# Patient Record
Sex: FEMALE | Race: WHITE | Employment: STUDENT | URBAN - METROPOLITAN AREA
[De-identification: names, ages, dates, MRNs, and addresses within clinical notes are randomized per-mention and may not be internally consistent; named-entity substitution may affect disease eponyms.]

---

## 2018-01-11 ENCOUNTER — OFFICE VISIT (OUTPATIENT)
Dept: OBGYN CLINIC | Facility: CLINIC | Age: 23
End: 2018-01-11

## 2018-01-11 VITALS
SYSTOLIC BLOOD PRESSURE: 110 MMHG | DIASTOLIC BLOOD PRESSURE: 50 MMHG | WEIGHT: 146 LBS | HEIGHT: 65 IN | BODY MASS INDEX: 24.32 KG/M2 | HEART RATE: 96 BPM

## 2018-01-11 DIAGNOSIS — Z11.3 SCREEN FOR STD (SEXUALLY TRANSMITTED DISEASE): ICD-10-CM

## 2018-01-11 DIAGNOSIS — Z30.40 ENCOUNTER FOR SURVEILLANCE OF CONTRACEPTIVES, UNSPECIFIED CONTRACEPTIVE: ICD-10-CM

## 2018-01-11 DIAGNOSIS — Z01.419 WELL WOMAN EXAM WITH ROUTINE GYNECOLOGICAL EXAM: Primary | ICD-10-CM

## 2018-01-11 DIAGNOSIS — T83.32XA INTRAUTERINE CONTRACEPTIVE DEVICE THREADS LOST, INITIAL ENCOUNTER: ICD-10-CM

## 2018-01-11 PROCEDURE — 87591 N.GONORRHOEAE DNA AMP PROB: CPT | Performed by: OBSTETRICS & GYNECOLOGY

## 2018-01-11 PROCEDURE — 88175 CYTOPATH C/V AUTO FLUID REDO: CPT | Performed by: OBSTETRICS & GYNECOLOGY

## 2018-01-11 PROCEDURE — 87491 CHLMYD TRACH DNA AMP PROBE: CPT | Performed by: OBSTETRICS & GYNECOLOGY

## 2018-01-11 PROCEDURE — 99395 PREV VISIT EST AGE 18-39: CPT | Performed by: OBSTETRICS & GYNECOLOGY

## 2018-01-11 NOTE — PROGRESS NOTES
Azeb Jacob is a 25year old female  No LMP recorded. Patient is not currently having periods (Reason: IUD - Intrauterine Device). Patient presents with:  Wellness Visit  IUD: check  . Granger Marielle has been done. Flu shot was encouraged. IUD, 1 each by Intrauterine route once., Disp: , Rfl:     ALLERGIES:    Pcn [Penicillins]       Rash  Pollen                    Seasonal                      Review of Systems:  Constitutional:  Denies fatigue, night sweats, hot flashes  Gastrointestinal: for STD (sexually transmitted disease)    Intrauterine contraceptive device threads lost, initial encounter  -     US PELVIS (TRANSABDOMINAL PELVIS)  ([id number]);  Future

## 2018-01-12 LAB
C TRACH DNA SPEC QL NAA+PROBE: NEGATIVE
N GONORRHOEA DNA SPEC QL NAA+PROBE: NEGATIVE

## 2018-06-26 ENCOUNTER — OFFICE VISIT (OUTPATIENT)
Dept: OBGYN CLINIC | Facility: CLINIC | Age: 23
End: 2018-06-26

## 2018-06-26 VITALS
HEART RATE: 88 BPM | DIASTOLIC BLOOD PRESSURE: 58 MMHG | BODY MASS INDEX: 24.53 KG/M2 | SYSTOLIC BLOOD PRESSURE: 104 MMHG | HEIGHT: 65.25 IN | WEIGHT: 149 LBS

## 2018-06-26 DIAGNOSIS — L70.0 ACNE VULGARIS: Primary | ICD-10-CM

## 2018-06-26 PROCEDURE — 99212 OFFICE O/P EST SF 10 MIN: CPT | Performed by: OBSTETRICS & GYNECOLOGY

## 2018-06-26 RX ORDER — SPIRONOLACTONE 50 MG/1
25 TABLET, FILM COATED ORAL DAILY
Qty: 30 TABLET | Refills: 11 | Status: SHIPPED | OUTPATIENT
Start: 2018-06-26 | End: 2018-07-16

## 2018-06-26 NOTE — PROGRESS NOTES
When she was on the birth control pill her acne was controlled. She has periods on the Mirena at school but not at home. She is noticed increased acne. Does  want us to take Spironolactone 25 we will we will prescribe this.

## 2019-01-05 ENCOUNTER — HOSPITAL ENCOUNTER (OUTPATIENT)
Age: 24
Discharge: HOME | End: 2019-01-05
Attending: FAMILY MEDICINE
Payer: COMMERCIAL

## 2019-01-05 VITALS
TEMPERATURE: 97 F | HEIGHT: 65 IN | SYSTOLIC BLOOD PRESSURE: 103 MMHG | DIASTOLIC BLOOD PRESSURE: 79 MMHG | OXYGEN SATURATION: 100 % | WEIGHT: 145 LBS | BODY MASS INDEX: 24.16 KG/M2 | RESPIRATION RATE: 18 BRPM | HEART RATE: 65 BPM

## 2019-01-05 DIAGNOSIS — L42 PITYRIASIS ROSEA: Primary | ICD-10-CM

## 2019-01-05 LAB — POCT RAPID STREP: NEGATIVE

## 2019-01-05 PROCEDURE — 99214 OFFICE O/P EST MOD 30 MIN: CPT

## 2019-01-05 PROCEDURE — 87081 CULTURE SCREEN ONLY: CPT | Performed by: FAMILY MEDICINE

## 2019-01-05 PROCEDURE — 99203 OFFICE O/P NEW LOW 30 MIN: CPT

## 2019-01-05 PROCEDURE — 87430 STREP A AG IA: CPT | Performed by: FAMILY MEDICINE

## 2019-01-05 NOTE — ED INITIAL ASSESSMENT (HPI)
Pt c/o noticed patch of dry skin to right chest 2 weeks ago, after going on vacation in [facility] and being in hot tubs, noticed more patches of the dry skin/or rash on chest and sides and left upper arm.  Pt reports only the patch on the right chest itche

## 2019-01-06 NOTE — ED PROVIDER NOTES
Patient Seen in: [address] [facility]    History   Patient presents with:  Rash Skin Problem (integumentary)    Stated Complaint: rash and sore throat x 14 days    HPI    21year old female presents for rash and sore throat.  [facility] Normal range of motion. Neck supple. Cardiovascular: Normal rate, regular rhythm and normal heart sounds. Pulmonary/Chest: Effort normal and breath sounds normal.   Abdominal: Soft. She exhibits no distension. There is no tenderness.    Neurological: Sh

## 2019-02-07 ENCOUNTER — OFFICE VISIT (OUTPATIENT)
Dept: INTERNAL MEDICINE CLINIC | Facility: CLINIC | Age: 24
End: 2019-02-07
Payer: COMMERCIAL

## 2019-02-07 VITALS
BODY MASS INDEX: 23.3 KG/M2 | HEART RATE: 64 BPM | SYSTOLIC BLOOD PRESSURE: 90 MMHG | RESPIRATION RATE: 12 BRPM | TEMPERATURE: 98 F | WEIGHT: 146.69 LBS | DIASTOLIC BLOOD PRESSURE: 60 MMHG | HEIGHT: 66.5 IN

## 2019-02-07 DIAGNOSIS — L42 PITYRIASIS ROSEA: Primary | ICD-10-CM

## 2019-02-07 PROCEDURE — 99203 OFFICE O/P NEW LOW 30 MIN: CPT | Performed by: INTERNAL MEDICINE

## 2019-02-07 NOTE — PROGRESS NOTES
[facility]    CHIEF COMPLAINT:  Patient presents with:  Rash: has had rash since December, rash has been continuing to spread. HISTORY OF PRESENT ILLNESS:  The patient is a 21year old year old female who presents with rash for 2 months. of education: Not on file      Highest education level: Not on file    Social Needs      Financial resource strain: Not on file      Food insecurity - worry: Not on file      Food insecurity - inability: Not on file      Transportation needs - medical: Not perform all ADLs):  Yes    BODY HABITUS AND NUTRITION STATUS:  Body mass index is 23.32 kg/m². PHYSICAL EXAM:   BP 90/60 (BP Location: Left arm, Patient Position: Sitting, Cuff Size: adult)   Pulse 64   Temp 98.2 °F (36.8 °C) (Oral)   Resp 12   Ht 66. 5\

## 2019-02-11 ENCOUNTER — OFFICE VISIT (OUTPATIENT)
Dept: INTERNAL MEDICINE CLINIC | Facility: CLINIC | Age: 24
End: 2019-02-11
Payer: COMMERCIAL

## 2019-02-11 ENCOUNTER — TELEPHONE (OUTPATIENT)
Dept: INTERNAL MEDICINE CLINIC | Facility: CLINIC | Age: 24
End: 2019-02-11

## 2019-02-11 VITALS
SYSTOLIC BLOOD PRESSURE: 98 MMHG | WEIGHT: 147.88 LBS | RESPIRATION RATE: 12 BRPM | HEIGHT: 66.5 IN | BODY MASS INDEX: 23.49 KG/M2 | DIASTOLIC BLOOD PRESSURE: 70 MMHG | HEART RATE: 72 BPM | TEMPERATURE: 98 F

## 2019-02-11 DIAGNOSIS — Z00.00 PHYSICAL EXAM, ANNUAL: Primary | ICD-10-CM

## 2019-02-11 DIAGNOSIS — Z23 NEED FOR VACCINATION: ICD-10-CM

## 2019-02-11 PROCEDURE — 90472 IMMUNIZATION ADMIN EACH ADD: CPT | Performed by: INTERNAL MEDICINE

## 2019-02-11 PROCEDURE — 90471 IMMUNIZATION ADMIN: CPT | Performed by: INTERNAL MEDICINE

## 2019-02-11 PROCEDURE — 99395 PREV VISIT EST AGE 18-39: CPT | Performed by: INTERNAL MEDICINE

## 2019-02-11 PROCEDURE — 90715 TDAP VACCINE 7 YRS/> IM: CPT | Performed by: INTERNAL MEDICINE

## 2019-02-11 PROCEDURE — 90686 IIV4 VACC NO PRSV 0.5 ML IM: CPT | Performed by: INTERNAL MEDICINE

## 2019-02-11 NOTE — PROGRESS NOTES
[facility]    CHIEF COMPLAINT: Patient presents with:  Routine Physical: sees gyne, Dr. Danis Sauceda.  5/27/08- Tdap        HPI:   Floyd Bryant is a 21year old female who presents for a complete physical exam. Symptoms: denies discharge, itchin Hypertension Father    • Other (MI) Father 39   • Hypertension Paternal Grandfather    • Heart Disorder Paternal Grandfather    • Other (celiac) Brother    • Other (celiac) Paternal Aunt    • Other (celiac) Paternal Cousin       Social History:   Social Hi intact,motor and sensory are grossly intact    Labs:   No results found for: WBC, HGB, PLT   Lab Results   Component Value Date/Time    GLU 99 09/17/2018 09:07 AM     09/17/2018 09:07 AM    K 4.9 09/17/2018 09:07 AM     09/17/2018 09:07 AM    C

## 2019-02-14 ENCOUNTER — MED REC SCAN ONLY (OUTPATIENT)
Dept: INTERNAL MEDICINE CLINIC | Facility: CLINIC | Age: 24
End: 2019-02-14

## 2019-03-25 PROBLEM — R10.13 EPIGASTRIC PAIN: Status: ACTIVE | Noted: 2019-03-25

## 2019-03-25 PROBLEM — R10.84 ABDOMINAL PAIN, GENERALIZED: Status: ACTIVE | Noted: 2019-03-25

## 2019-03-25 PROBLEM — K92.1 MELENA: Status: ACTIVE | Noted: 2019-03-25

## 2019-03-25 PROBLEM — K64.0 FIRST DEGREE HEMORRHOIDS: Status: ACTIVE | Noted: 2019-03-25

## 2019-03-25 PROBLEM — R19.7 DIARRHEA: Status: ACTIVE | Noted: 2019-03-25

## 2019-07-26 ENCOUNTER — OFFICE VISIT (OUTPATIENT)
Dept: OBGYN CLINIC | Facility: CLINIC | Age: 24
End: 2019-07-26
Payer: COMMERCIAL

## 2019-07-26 VITALS
BODY MASS INDEX: 23.49 KG/M2 | WEIGHT: 141 LBS | DIASTOLIC BLOOD PRESSURE: 58 MMHG | HEIGHT: 65 IN | SYSTOLIC BLOOD PRESSURE: 114 MMHG | HEART RATE: 82 BPM

## 2019-07-26 DIAGNOSIS — Z11.3 SCREEN FOR STD (SEXUALLY TRANSMITTED DISEASE): ICD-10-CM

## 2019-07-26 DIAGNOSIS — Z01.419 WELL WOMAN EXAM WITH ROUTINE GYNECOLOGICAL EXAM: Primary | ICD-10-CM

## 2019-07-26 DIAGNOSIS — Z30.40 ENCOUNTER FOR SURVEILLANCE OF CONTRACEPTIVES, UNSPECIFIED CONTRACEPTIVE: ICD-10-CM

## 2019-07-26 PROCEDURE — 99395 PREV VISIT EST AGE 18-39: CPT | Performed by: OBSTETRICS & GYNECOLOGY

## 2019-07-26 PROCEDURE — 87591 N.GONORRHOEAE DNA AMP PROB: CPT | Performed by: OBSTETRICS & GYNECOLOGY

## 2019-07-26 PROCEDURE — 87491 CHLMYD TRACH DNA AMP PROBE: CPT | Performed by: OBSTETRICS & GYNECOLOGY

## 2019-07-26 NOTE — PROGRESS NOTES
Santino Benitez is a 21year old female  No LMP recorded. (Menstrual status: IUD - Intrauterine Device). Patient presents with:  Wellness Visit  . She complains of bad cramping with her. She spots for a couple days every month.   Occasional Frequency: Monthly or less        Drinks per session: 3 or 4        Binge frequency: Never      Drug use: No      Sexual activity: Not Currently        Partners: Male        Birth control/protection: IUD    Lifestyle      Physical activity:        Days p Phosphate 1 % External Gel, Apply 1 Application topically 2 (two) times daily. Apply thin film to the affected area(s). , Disp: 60 g, Rfl: 2  •  Multiple Vitamins-Minerals (MULTIVITAL OR), Take by mouth., Disp: , Rfl:   •  Levonorgestrel (MIRENA, 52 MG,) 20 hemorroids     Assessment & Plan:  Deshawn Wolff was seen today for wellness visit.     Diagnoses and all orders for this visit:    Well woman exam with routine gynecological exam    Encounter for surveillance of contraceptives, unspecified contraceptive    Sc

## 2019-07-28 LAB
C TRACH DNA SPEC QL NAA+PROBE: NEGATIVE
N GONORRHOEA DNA SPEC QL NAA+PROBE: NEGATIVE

## 2021-09-01 ENCOUNTER — TELEPHONE (OUTPATIENT)
Dept: INTERNAL MEDICINE CLINIC | Facility: CLINIC | Age: 26
End: 2021-09-01

## (undated) NOTE — LETTER
09/28/21         [address]  [address] [id number]      Dear Shy Rojas,    We have tried to reach you on multiple occasions in regards to scheduling your physical. You are over due for you physical with Dr. Carlton Berrios.  Please give th